# Patient Record
Sex: MALE | Race: OTHER | Employment: OTHER | URBAN - METROPOLITAN AREA
[De-identification: names, ages, dates, MRNs, and addresses within clinical notes are randomized per-mention and may not be internally consistent; named-entity substitution may affect disease eponyms.]

---

## 2017-05-26 ENCOUNTER — IMPORTED ENCOUNTER (OUTPATIENT)
Dept: URBAN - METROPOLITAN AREA CLINIC 9 | Facility: CLINIC | Age: 82
End: 2017-05-26

## 2017-06-09 ENCOUNTER — IMPORTED ENCOUNTER (OUTPATIENT)
Dept: URBAN - METROPOLITAN AREA CLINIC 9 | Facility: CLINIC | Age: 82
End: 2017-06-09

## 2017-10-13 ENCOUNTER — IMPORTED ENCOUNTER (OUTPATIENT)
Dept: URBAN - METROPOLITAN AREA CLINIC 9 | Facility: CLINIC | Age: 82
End: 2017-10-13

## 2017-12-13 ENCOUNTER — IMPORTED ENCOUNTER (OUTPATIENT)
Dept: URBAN - METROPOLITAN AREA CLINIC 9 | Facility: CLINIC | Age: 82
End: 2017-12-13

## 2018-05-28 ENCOUNTER — IMPORTED ENCOUNTER (OUTPATIENT)
Dept: URBAN - METROPOLITAN AREA CLINIC 9 | Facility: CLINIC | Age: 83
End: 2018-05-28

## 2018-06-13 ENCOUNTER — IMPORTED ENCOUNTER (OUTPATIENT)
Dept: URBAN - METROPOLITAN AREA CLINIC 9 | Facility: CLINIC | Age: 83
End: 2018-06-13

## 2018-12-31 ENCOUNTER — IMPORTED ENCOUNTER (OUTPATIENT)
Dept: URBAN - METROPOLITAN AREA CLINIC 9 | Facility: CLINIC | Age: 83
End: 2018-12-31

## 2019-01-02 ENCOUNTER — IMPORTED ENCOUNTER (OUTPATIENT)
Dept: URBAN - METROPOLITAN AREA CLINIC 9 | Facility: CLINIC | Age: 84
End: 2019-01-02

## 2019-01-10 NOTE — PROCEDURE NOTE: SURGICAL
<p>Prior to commencing surgery patient identification, surgical procedure, site, and side were confirmed by Dr. Renea Vyas. Following topical proparacaine anesthesia, the patient was positioned at the YAG laser, a contact lens coupled to the cornea with methylcellulose and an axial posterior capsulotomy performed without complication using 2.6 Mj x 34. Excess methylcellulose was washed from the eye, one drop of Alphagan was instilled and the patient returned to the holding area having tolerated the procedure well and without complication. </p><p>MRN:[id number]</p>

## 2019-02-08 NOTE — PATIENT DISCUSSION
Discussed the use of warm compresses with boiled egg and wash cloth. Then finger massage 3-4 times/day for 3-4 days.

## 2019-02-08 NOTE — PATIENT DISCUSSION
recommend see [facility]ne[facility] to eval and +/- Bx.  pigmented with fairly regular borders but some local vasc and mild coloring irregularities.

## 2019-02-21 NOTE — PATIENT DISCUSSION
recommend see [facility] to eval and +/- Bx.  pigmented with fairly regular borders but some local vasc and mild coloring irregularities.

## 2019-06-03 ENCOUNTER — IMPORTED ENCOUNTER (OUTPATIENT)
Dept: URBAN - METROPOLITAN AREA CLINIC 9 | Facility: CLINIC | Age: 84
End: 2019-06-03

## 2019-08-09 ENCOUNTER — IMPORTED ENCOUNTER (OUTPATIENT)
Dept: URBAN - METROPOLITAN AREA CLINIC 9 | Facility: CLINIC | Age: 84
End: 2019-08-09

## 2019-08-13 NOTE — PATIENT DISCUSSION
Patient is returning phone call. Patient is going to work at 9 so if able to reach before that is ok. Otherwise she will try calling on her lunch break.    The types of intraocular lenses were reviewed with the patient along with a discussion of their various strengths and weaknesses.

## 2019-11-21 NOTE — PATIENT DISCUSSION
recommend see [facility] to eval and +/- Bx.  pigmented with fairly regular borders but some local vasc and mild coloring irregularities.

## 2020-01-08 NOTE — PATIENT DISCUSSION
recommend see [facility] to eval and +/- Bx.  pigmented with fairly regular borders but some local vasc and mild coloring irregularities.

## 2020-02-12 ENCOUNTER — IMPORTED ENCOUNTER (OUTPATIENT)
Dept: URBAN - METROPOLITAN AREA CLINIC 9 | Facility: CLINIC | Age: 85
End: 2020-02-12

## 2020-02-17 ENCOUNTER — CONSULTATION (OUTPATIENT)
Dept: URBAN - METROPOLITAN AREA CLINIC 11 | Facility: CLINIC | Age: 85
End: 2020-02-17

## 2020-02-17 ASSESSMENT — TONOMETRY
OD_IOP_MMHG: 20
OS_IOP_MMHG: 21

## 2020-02-17 ASSESSMENT — VISUAL ACUITY
OS_CC: 20/25-2
OD_CC: 20/200

## 2020-06-02 ENCOUNTER — FOLLOW UP (OUTPATIENT)
Dept: URBAN - METROPOLITAN AREA CLINIC 11 | Facility: CLINIC | Age: 85
End: 2020-06-02

## 2020-06-02 ASSESSMENT — TONOMETRY
OS_IOP_MMHG: 16
OD_IOP_MMHG: 12

## 2020-06-02 ASSESSMENT — VISUAL ACUITY
OS_CC: 20/30-2
OD_CC: 20/200

## 2020-08-18 ENCOUNTER — IMPORTED ENCOUNTER (OUTPATIENT)
Dept: URBAN - METROPOLITAN AREA CLINIC 9 | Facility: CLINIC | Age: 85
End: 2020-08-18

## 2020-08-18 PROBLEM — H04.123: Noted: 2020-08-18

## 2020-08-18 PROBLEM — H33.312: Noted: 2020-08-18

## 2020-08-18 PROBLEM — H01.024: Noted: 2020-08-18

## 2020-08-18 PROBLEM — H35.413: Noted: 2020-08-18

## 2020-08-18 PROBLEM — H01.021: Noted: 2020-08-18

## 2020-08-25 ENCOUNTER — FOLLOW UP (OUTPATIENT)
Dept: URBAN - METROPOLITAN AREA CLINIC 11 | Facility: CLINIC | Age: 85
End: 2020-08-25

## 2020-08-25 ASSESSMENT — TONOMETRY
OD_IOP_MMHG: 12
OS_IOP_MMHG: 13

## 2020-08-25 ASSESSMENT — VISUAL ACUITY
OD_CC: 20/200
OS_CC: 20/30-1

## 2021-02-09 ENCOUNTER — FOLLOW UP (OUTPATIENT)
Dept: URBAN - METROPOLITAN AREA CLINIC 11 | Facility: CLINIC | Age: 86
End: 2021-02-09

## 2021-02-09 ASSESSMENT — TONOMETRY
OD_IOP_MMHG: 17
OS_IOP_MMHG: 18

## 2021-02-09 ASSESSMENT — VISUAL ACUITY
OS_CC: 20/30-2
OD_CC: 20/200

## 2021-03-11 NOTE — PATIENT DISCUSSION
MILD and pt says is improving.  Start steroid drops.  start Durezol BID OD for 5 days then QD OD until gone.

## 2021-03-11 NOTE — PATIENT DISCUSSION
recommend see [facility] to eval and +/- Bx.  pigmented with fairly regular borders but some local vasc and mild coloring irregularities.

## 2021-04-08 NOTE — PATIENT DISCUSSION
3/11/21: MILD and pt says is improving.  Start steroid drops.  start Durezol BID OD for 5 days then QD OD until gone.

## 2021-04-08 NOTE — PATIENT DISCUSSION
recommend see [facility] to eval and +/- Bx.  pigmented with fairly regular borders but some local vasc and mild coloring irregularities.

## 2021-04-08 NOTE — PATIENT DISCUSSION
4/8/2021:  superficial vasc is less injected but has underlying violaceous hue from 3 to 6 in conj periphery.  has some remaining inflammation but needs to DC Durezol now due to IOP starting to rise a bit.  May want TC [facility] due to prominent corkscrew vessel in conj at 3 and 6 o'clock position.  rec consult with RAY for further evaluation.

## 2021-05-06 NOTE — PATIENT DISCUSSION
recommend see [facility] to eval and +/- Bx.  pigmented with fairly regular borders but some local vasc and mild coloring irregularities.

## 2021-05-06 NOTE — PATIENT DISCUSSION
5/6/21: 1-2 clock hours of episcleritis, no pain with palpation. Recommend Durezol BID until patient has upcoming colonoscopy then start Indomethacin 25mg TID for 3 weeks. Add omeprazole 20mg daily while on Indomethacin. RTC in 3-4 weeks.

## 2021-05-28 NOTE — PATIENT DISCUSSION
5/6/21 JOD: 1-2 clock hours of episcleritis, no pain with palpation. Recommend Durezol BID until patient has upcoming colonoscopy then start Indomethacin 25mg TID for 3 weeks. Add omeprazole 20mg daily while on Indomethacin. RTC in 3-4 weeks.

## 2021-05-28 NOTE — PATIENT DISCUSSION
3/11/21 KMS: MILD and pt says is improving.  Start steroid drops.  start Durezol BID OD for 5 days then QD OD until gone.

## 2021-05-28 NOTE — PATIENT DISCUSSION
recommend see [facility] to eval and +/- Bx.  pigmented with fairly regular borders but some local vasc and mild coloring irregularities.

## 2021-05-28 NOTE — PATIENT DISCUSSION
5/28/21 JOD: Resolving. Recommend AT's for irritation. Patient can finish Indomethacin bottle (TID x 1 week then BID until it runs out), stay on Prilosec while on Indomethacin. D/C durezol.

## 2021-05-28 NOTE — PATIENT DISCUSSION
4/8/2021KMS:  superficial vasc is less injected but has underlying violaceous hue from 3 to 6 in conj periphery.  has some remaining inflammation but needs to DC Durezol now due to IOP starting to rise a bit.  May want TC [facility] due to prominent corkscrew vessel in conj at 3 and 6 o'clock position.  rec consult with RAY for further evaluation.

## 2021-08-10 ENCOUNTER — FOLLOW UP (OUTPATIENT)
Dept: URBAN - METROPOLITAN AREA CLINIC 11 | Facility: CLINIC | Age: 86
End: 2021-08-10

## 2021-08-10 DIAGNOSIS — H33.322: ICD-10-CM

## 2021-08-10 DIAGNOSIS — H43.813: ICD-10-CM

## 2021-08-10 DIAGNOSIS — H35.3132: ICD-10-CM

## 2021-08-10 PROCEDURE — 92134 CPTRZ OPH DX IMG PST SGM RTA: CPT

## 2021-08-10 PROCEDURE — 99214 OFFICE O/P EST MOD 30 MIN: CPT

## 2021-08-10 ASSESSMENT — VISUAL ACUITY: OS_CC: 20/30+2

## 2021-08-10 ASSESSMENT — TONOMETRY
OD_IOP_MMHG: 17
OS_IOP_MMHG: 17

## 2021-10-18 ASSESSMENT — TONOMETRY
OD_IOP_MMHG: 16
OS_IOP_MMHG: 15
OS_IOP_MMHG: 19
OS_IOP_MMHG: 17
OD_IOP_MMHG: 17
OS_IOP_MMHG: 19
OS_IOP_MMHG: 22
OD_IOP_MMHG: 17
OS_IOP_MMHG: 17
OD_IOP_MMHG: 18
OD_IOP_MMHG: 22
OD_IOP_MMHG: 13
OS_IOP_MMHG: 16
OD_IOP_MMHG: 17
OS_IOP_MMHG: 14
OD_IOP_MMHG: 18
OS_IOP_MMHG: 17
OD_IOP_MMHG: 19
OS_IOP_MMHG: 20
OD_IOP_MMHG: 14
OD_IOP_MMHG: 18
OS_IOP_MMHG: 17

## 2021-10-18 ASSESSMENT — VISUAL ACUITY
OD_CC: 20/30 - SN
OD_CC: 20/30 -2 SN
OD_CC: 20/40 -2 SN
OS_CC: 20/50 - SN
OD_CC: 20/30 - SN
OD_CC: 20/25 -2 SN
OS_CC: 20/40 - SN
OS_CC: 20/30 + SN
OD_CC: 20/50 -2 SN
OS_CC: 20/30 SN
OS_CC: 20/25 - SN
OD_CC: 20/200 SN
OD_CC: 20/50 SN
OD_CC: 20/40 SN
OD_CC: 20/25 -2 SN
OS_CC: 20/40 - SN
OD_CC: 20/40 - SN
OD_CC: 20/40 -2 SN
OS_CC: 20/25 -2 SN
OD_PH: 20/40 SN
OS_CC: 20/30 -2 SN
OS_CC: 20/30 - SN
OS_CC: 20/25 SN
OS_CC: 20/25 -2 SN
OS_CC: 20/30 SN
OD_CC: 20/25 SN
OS_CC: 20/40 - SN

## 2021-10-26 ENCOUNTER — ESTABLISHED PATIENT (OUTPATIENT)
Dept: URBAN - METROPOLITAN AREA CLINIC 9 | Facility: CLINIC | Age: 86
End: 2021-10-26

## 2021-10-26 DIAGNOSIS — H35.3114: ICD-10-CM

## 2021-10-26 DIAGNOSIS — H04.123: ICD-10-CM

## 2021-10-26 DIAGNOSIS — H35.3122: ICD-10-CM

## 2021-10-26 PROCEDURE — 92014 COMPRE OPH EXAM EST PT 1/>: CPT

## 2021-10-26 PROCEDURE — 92134 CPTRZ OPH DX IMG PST SGM RTA: CPT

## 2021-10-26 PROCEDURE — 92015 DETERMINE REFRACTIVE STATE: CPT

## 2021-10-26 ASSESSMENT — TONOMETRY
OS_IOP_MMHG: 13
OD_IOP_MMHG: 14

## 2022-01-28 ENCOUNTER — ESTABLISHED PATIENT (OUTPATIENT)
Dept: URBAN - METROPOLITAN AREA CLINIC 9 | Facility: CLINIC | Age: 87
End: 2022-01-28

## 2022-01-28 DIAGNOSIS — H35.3114: ICD-10-CM

## 2022-01-28 DIAGNOSIS — H35.3122: ICD-10-CM

## 2022-01-28 DIAGNOSIS — H04.123: ICD-10-CM

## 2022-01-28 PROCEDURE — 92134 CPTRZ OPH DX IMG PST SGM RTA: CPT

## 2022-01-28 PROCEDURE — 92014 COMPRE OPH EXAM EST PT 1/>: CPT

## 2022-01-28 ASSESSMENT — TONOMETRY
OD_IOP_MMHG: 16
OS_IOP_MMHG: 21

## 2022-01-28 ASSESSMENT — VISUAL ACUITY
OS_CC: 20/60
OD_CC: CF 3FT

## 2022-02-08 ENCOUNTER — COMPREHENSIVE EXAM (OUTPATIENT)
Dept: URBAN - METROPOLITAN AREA CLINIC 11 | Facility: CLINIC | Age: 87
End: 2022-02-08

## 2022-02-08 DIAGNOSIS — H35.3112: ICD-10-CM

## 2022-02-08 DIAGNOSIS — H35.3221: ICD-10-CM

## 2022-02-08 PROCEDURE — 92134 CPTRZ OPH DX IMG PST SGM RTA: CPT

## 2022-02-08 PROCEDURE — 99214 OFFICE O/P EST MOD 30 MIN: CPT

## 2022-02-08 PROCEDURE — 67028 INJECTION EYE DRUG: CPT

## 2022-02-08 ASSESSMENT — VISUAL ACUITY
OS_CC: 20/400
OD_CC: CF 3FT

## 2022-02-08 ASSESSMENT — TONOMETRY
OD_IOP_MMHG: 18
OS_IOP_MMHG: 19

## 2022-03-08 ENCOUNTER — COMPREHENSIVE EXAM (OUTPATIENT)
Dept: URBAN - METROPOLITAN AREA CLINIC 11 | Facility: CLINIC | Age: 87
End: 2022-03-08

## 2022-03-08 DIAGNOSIS — H35.3221: ICD-10-CM

## 2022-03-08 PROCEDURE — 92201 OPSCPY EXTND RTA DRAW UNI/BI: CPT

## 2022-03-08 PROCEDURE — 99213 OFFICE O/P EST LOW 20 MIN: CPT

## 2022-03-08 PROCEDURE — 67028 INJECTION EYE DRUG: CPT

## 2022-03-08 ASSESSMENT — VISUAL ACUITY
OS_CC: 20/50-1
OD_CC: CF 4FT

## 2022-04-12 ENCOUNTER — CLINIC PROCEDURE ONLY (OUTPATIENT)
Dept: URBAN - METROPOLITAN AREA CLINIC 11 | Facility: CLINIC | Age: 87
End: 2022-04-12

## 2022-04-12 DIAGNOSIS — H35.3221: ICD-10-CM

## 2022-04-12 PROCEDURE — 67028 INJECTION EYE DRUG: CPT

## 2022-04-12 ASSESSMENT — VISUAL ACUITY
OD_CC: CF 4FT
OS_CC: 20/50-2

## 2022-04-12 ASSESSMENT — TONOMETRY: OS_IOP_MMHG: 16

## 2022-05-17 ENCOUNTER — FOLLOW UP (OUTPATIENT)
Dept: URBAN - METROPOLITAN AREA CLINIC 11 | Facility: CLINIC | Age: 87
End: 2022-05-17

## 2022-05-17 DIAGNOSIS — H35.3221: ICD-10-CM

## 2022-05-17 PROCEDURE — 99213 OFFICE O/P EST LOW 20 MIN: CPT

## 2022-05-17 PROCEDURE — 92134 CPTRZ OPH DX IMG PST SGM RTA: CPT

## 2022-05-17 ASSESSMENT — VISUAL ACUITY
OS_SC: 20/200
OD_SC: 20/100

## 2022-05-17 ASSESSMENT — TONOMETRY
OD_IOP_MMHG: 18
OS_IOP_MMHG: 11

## 2022-05-20 ENCOUNTER — FOLLOW UP (OUTPATIENT)
Dept: URBAN - METROPOLITAN AREA CLINIC 9 | Facility: CLINIC | Age: 87
End: 2022-05-20

## 2022-05-20 DIAGNOSIS — H33.322: ICD-10-CM

## 2022-05-20 DIAGNOSIS — H35.3221: ICD-10-CM

## 2022-05-20 DIAGNOSIS — H35.3112: ICD-10-CM

## 2022-05-20 PROCEDURE — 99213 OFFICE O/P EST LOW 20 MIN: CPT

## 2022-05-20 PROCEDURE — 92015 DETERMINE REFRACTIVE STATE: CPT

## 2022-05-20 ASSESSMENT — KERATOMETRY
OD_K1POWER_DIOPTERS: 44.75
OD_AXISANGLE2_DEGREES: 71
OS_K2POWER_DIOPTERS: 45.50
OD_AXISANGLE_DEGREES: 161
OS_AXISANGLE2_DEGREES: 19
OD_K2POWER_DIOPTERS: 45.25
OS_AXISANGLE_DEGREES: 109
OS_K1POWER_DIOPTERS: 44

## 2022-05-20 ASSESSMENT — VISUAL ACUITY
OS_CC: CF 2FT
OD_CC: 20/200

## 2022-06-18 RX ORDER — AMLODIPINE BESYLATE 5 MG/1
TABLET ORAL
COMMUNITY

## 2022-06-18 RX ORDER — CHLORTHALIDONE 25 MG/1
TABLET ORAL
COMMUNITY
Start: 2020-02-26

## 2022-06-18 RX ORDER — LOSARTAN POTASSIUM 50 MG/1
TABLET ORAL
COMMUNITY
Start: 2019-10-04

## 2022-06-18 RX ORDER — ATORVASTATIN CALCIUM 80 MG/1
TABLET, FILM COATED ORAL
COMMUNITY

## 2022-06-18 RX ORDER — LEVOTHYROXINE SODIUM 0.05 MG/1
TABLET ORAL
COMMUNITY
Start: 2020-05-29

## 2022-06-18 RX ORDER — FINASTERIDE 5 MG/1
TABLET, FILM COATED ORAL
COMMUNITY

## 2022-06-28 ENCOUNTER — FOLLOW UP (OUTPATIENT)
Dept: URBAN - METROPOLITAN AREA CLINIC 11 | Facility: CLINIC | Age: 87
End: 2022-06-28

## 2022-06-28 DIAGNOSIS — H35.413: ICD-10-CM

## 2022-06-28 DIAGNOSIS — H35.3221: ICD-10-CM

## 2022-06-28 DIAGNOSIS — H33.322: ICD-10-CM

## 2022-06-28 DIAGNOSIS — H43.813: ICD-10-CM

## 2022-06-28 DIAGNOSIS — H35.3112: ICD-10-CM

## 2022-06-28 PROCEDURE — 99214 OFFICE O/P EST MOD 30 MIN: CPT

## 2022-06-28 PROCEDURE — 92134 CPTRZ OPH DX IMG PST SGM RTA: CPT

## 2022-06-28 PROCEDURE — 92201 OPSCPY EXTND RTA DRAW UNI/BI: CPT

## 2022-06-28 ASSESSMENT — TONOMETRY
OS_IOP_MMHG: 19
OD_IOP_MMHG: 18

## 2022-06-28 ASSESSMENT — VISUAL ACUITY
OS_CC: CF 2FT
OD_CC: 20/100

## 2022-09-08 ENCOUNTER — FOLLOW UP (OUTPATIENT)
Dept: URBAN - METROPOLITAN AREA CLINIC 11 | Facility: CLINIC | Age: 87
End: 2022-09-08

## 2022-09-08 DIAGNOSIS — H35.413: ICD-10-CM

## 2022-09-08 DIAGNOSIS — H35.3221: ICD-10-CM

## 2022-09-08 DIAGNOSIS — H43.813: ICD-10-CM

## 2022-09-08 DIAGNOSIS — H35.3112: ICD-10-CM

## 2022-09-08 PROCEDURE — 99214 OFFICE O/P EST MOD 30 MIN: CPT

## 2022-09-08 PROCEDURE — 92134 CPTRZ OPH DX IMG PST SGM RTA: CPT

## 2022-09-08 ASSESSMENT — VISUAL ACUITY
OS_CC: CF 2FT
OD_CC: 20/100

## 2022-09-08 ASSESSMENT — TONOMETRY
OD_IOP_MMHG: 10
OS_IOP_MMHG: 12

## 2022-11-03 NOTE — PATIENT DISCUSSION
11/3/2022:  Warm Compresses have made it some better but has focal nodule central RLL that appears will need I and D.  consult KK for eval.

## 2022-11-11 ENCOUNTER — ESTABLISHED PATIENT (OUTPATIENT)
Dept: URBAN - METROPOLITAN AREA CLINIC 9 | Facility: CLINIC | Age: 87
End: 2022-11-11

## 2022-11-11 DIAGNOSIS — H43.813: ICD-10-CM

## 2022-11-11 DIAGNOSIS — H35.413: ICD-10-CM

## 2022-11-11 DIAGNOSIS — H04.123: ICD-10-CM

## 2022-11-11 DIAGNOSIS — H35.3112: ICD-10-CM

## 2022-11-11 DIAGNOSIS — H35.3221: ICD-10-CM

## 2022-11-11 PROCEDURE — 92014 COMPRE OPH EXAM EST PT 1/>: CPT

## 2022-11-11 PROCEDURE — 92134 CPTRZ OPH DX IMG PST SGM RTA: CPT

## 2022-11-11 ASSESSMENT — VISUAL ACUITY
OU_SC: 20/100+1
OD_SC: 20/100+1
OU_CC: 20/60+1
OS_SC: CF 3FT
OS_CC: CF 3FT
OD_CC: 20/60+1

## 2022-11-11 ASSESSMENT — TONOMETRY
OD_IOP_MMHG: 14
OS_IOP_MMHG: 12

## 2022-12-08 ENCOUNTER — ESTABLISHED PATIENT (OUTPATIENT)
Dept: URBAN - METROPOLITAN AREA CLINIC 11 | Facility: CLINIC | Age: 87
End: 2022-12-08

## 2022-12-08 PROCEDURE — 99214 OFFICE O/P EST MOD 30 MIN: CPT

## 2022-12-08 PROCEDURE — 92134 CPTRZ OPH DX IMG PST SGM RTA: CPT

## 2022-12-08 ASSESSMENT — VISUAL ACUITY
OS_CC: CF 3FT
OD_CC: 20/50-2

## 2022-12-08 ASSESSMENT — TONOMETRY
OD_IOP_MMHG: 17
OS_IOP_MMHG: 19

## 2023-06-22 ENCOUNTER — COMPREHENSIVE EXAM (OUTPATIENT)
Dept: URBAN - METROPOLITAN AREA CLINIC 11 | Facility: CLINIC | Age: 88
End: 2023-06-22

## 2023-06-22 DIAGNOSIS — H35.3112: ICD-10-CM

## 2023-06-22 DIAGNOSIS — H43.813: ICD-10-CM

## 2023-06-22 DIAGNOSIS — H35.3221: ICD-10-CM

## 2023-06-22 DIAGNOSIS — H35.413: ICD-10-CM

## 2023-06-22 PROCEDURE — 99214 OFFICE O/P EST MOD 30 MIN: CPT

## 2023-06-22 PROCEDURE — 92134 CPTRZ OPH DX IMG PST SGM RTA: CPT

## 2023-06-22 ASSESSMENT — VISUAL ACUITY: OD_CC: 20/100

## 2023-06-22 ASSESSMENT — TONOMETRY
OS_IOP_MMHG: 16
OD_IOP_MMHG: 14

## 2023-12-21 ENCOUNTER — COMPREHENSIVE EXAM (OUTPATIENT)
Dept: URBAN - METROPOLITAN AREA CLINIC 11 | Facility: CLINIC | Age: 88
End: 2023-12-21

## 2023-12-21 DIAGNOSIS — H33.012: ICD-10-CM

## 2023-12-21 DIAGNOSIS — H43.813: ICD-10-CM

## 2023-12-21 DIAGNOSIS — H35.413: ICD-10-CM

## 2023-12-21 DIAGNOSIS — H35.3221: ICD-10-CM

## 2023-12-21 DIAGNOSIS — H35.3112: ICD-10-CM

## 2023-12-21 PROCEDURE — 99214 OFFICE O/P EST MOD 30 MIN: CPT

## 2023-12-21 PROCEDURE — 92134 CPTRZ OPH DX IMG PST SGM RTA: CPT

## 2023-12-21 ASSESSMENT — TONOMETRY
OS_IOP_MMHG: 13
OD_IOP_MMHG: 16

## 2023-12-21 ASSESSMENT — VISUAL ACUITY: OD_CC: 20/100-1

## 2024-06-06 ENCOUNTER — COMPREHENSIVE EXAM (OUTPATIENT)
Dept: URBAN - METROPOLITAN AREA CLINIC 11 | Facility: CLINIC | Age: 89
End: 2024-06-06

## 2024-06-06 DIAGNOSIS — H43.813: ICD-10-CM

## 2024-06-06 DIAGNOSIS — H33.012: ICD-10-CM

## 2024-06-06 DIAGNOSIS — H35.3112: ICD-10-CM

## 2024-06-06 DIAGNOSIS — H35.3221: ICD-10-CM

## 2024-06-06 DIAGNOSIS — H35.413: ICD-10-CM

## 2024-06-06 PROCEDURE — 99214 OFFICE O/P EST MOD 30 MIN: CPT

## 2024-06-06 PROCEDURE — 92134 CPTRZ OPH DX IMG PST SGM RTA: CPT

## 2024-06-06 ASSESSMENT — TONOMETRY
OD_IOP_MMHG: 14
OS_IOP_MMHG: 28

## 2024-06-06 ASSESSMENT — VISUAL ACUITY: OD_CC: 20/200

## 2024-09-09 NOTE — PATIENT DISCUSSION
The patient has mild cornea guttata, or early Fuch's endothelial dystrophy. Specular microscopy documented the cell count and morphology. The possibility that the condition could progress was explained. At this early stage observation and serial specular microscopy are indicated. children/spouse

## 2024-12-02 ENCOUNTER — COMPREHENSIVE EXAM (OUTPATIENT)
Age: 89
End: 2024-12-02

## 2024-12-02 DIAGNOSIS — H35.413: ICD-10-CM

## 2024-12-02 DIAGNOSIS — H43.813: ICD-10-CM

## 2024-12-02 DIAGNOSIS — H35.3221: ICD-10-CM

## 2024-12-02 DIAGNOSIS — H35.3114: ICD-10-CM

## 2024-12-02 DIAGNOSIS — Z96.1: ICD-10-CM

## 2024-12-02 DIAGNOSIS — H33.012: ICD-10-CM

## 2024-12-02 PROCEDURE — 92134 CPTRZ OPH DX IMG PST SGM RTA: CPT

## 2024-12-02 PROCEDURE — 99214 OFFICE O/P EST MOD 30 MIN: CPT

## 2025-06-18 NOTE — PATIENT DISCUSSION
Biopsy performed today. Tara wrote into the office via [facility]:    I haven’t had any other side effects that I’ve noticed.     Please advise.